# Patient Record
Sex: FEMALE | Race: WHITE | URBAN - METROPOLITAN AREA
[De-identification: names, ages, dates, MRNs, and addresses within clinical notes are randomized per-mention and may not be internally consistent; named-entity substitution may affect disease eponyms.]

---

## 2019-08-18 ENCOUNTER — HOSPITAL ENCOUNTER (OUTPATIENT)
Age: 24
Discharge: HOME | End: 2019-08-18
Attending: FAMILY MEDICINE
Payer: COMMERCIAL

## 2019-08-18 ENCOUNTER — APPOINTMENT (OUTPATIENT)
Dept: GENERAL RADIOLOGY | Age: 24
End: 2019-08-18
Attending: FAMILY MEDICINE
Payer: COMMERCIAL

## 2019-08-18 VITALS
SYSTOLIC BLOOD PRESSURE: 120 MMHG | DIASTOLIC BLOOD PRESSURE: 73 MMHG | TEMPERATURE: 98 F | WEIGHT: 135 LBS | OXYGEN SATURATION: 99 % | HEART RATE: 77 BPM | RESPIRATION RATE: 16 BRPM

## 2019-08-18 DIAGNOSIS — M79.671 RIGHT FOOT PAIN: ICD-10-CM

## 2019-08-18 DIAGNOSIS — M25.571 ACUTE RIGHT ANKLE PAIN: Primary | ICD-10-CM

## 2019-08-18 PROCEDURE — 73630 X-RAY EXAM OF FOOT: CPT | Performed by: FAMILY MEDICINE

## 2019-08-18 PROCEDURE — 99204 OFFICE O/P NEW MOD 45 MIN: CPT

## 2019-08-18 PROCEDURE — 99203 OFFICE O/P NEW LOW 30 MIN: CPT

## 2019-08-18 PROCEDURE — 73610 X-RAY EXAM OF ANKLE: CPT | Performed by: FAMILY MEDICINE

## 2019-08-18 NOTE — ED PROVIDER NOTES
Patient Seen in: [id number] [facility]    History   Patient presents with:  Lower Extremity Injury (musculoskeletal)    Stated Complaint: right foot injury    18-year-old female comes in to get checked for right ankle and foot pain since yester (36.3 °C)   Resp 16   Wt 61.2 kg   SpO2 100%         Physical Exam   Constitutional: She is oriented to person, place, and time. No distress. HENT:   Head: Normocephalic and atraumatic.    Right Ear: External ear normal.   Left Ear: External ear normal. swelling and no ecchymosis. No tenderness found. Left knee: She exhibits normal range of motion, no swelling and no ecchymosis. No tenderness found. Right ankle: She exhibits decreased range of motion and swelling.  She exhibits no ecchymosis, no deformity. Neurological: She is alert and oriented to person, place, and time. Skin: Skin is warm and dry. Psychiatric: She has a normal mood and affect.  Her behavior is normal. Judgment and thought content normal. Cognition and memory are normal.

## 2019-08-18 NOTE — ED INITIAL ASSESSMENT (HPI)
Right foot pain post missing one step, fell down on grass, twisted right foot, heard a pop, + foot swelling, ice pack given

## (undated) NOTE — LETTER
Date & Time: 8/18/2019, 1:18 PM  Patient: Baby Ficks  Encounter Provider(s): Chloé Byers MD       To Whom It May Concern:    Oswaldo lFores was seen and treated in our department on 8/18/2019.  She can return to work from 08/19/2019, av